# Patient Record
Sex: FEMALE | Race: WHITE | NOT HISPANIC OR LATINO | Employment: OTHER
[De-identification: names, ages, dates, MRNs, and addresses within clinical notes are randomized per-mention and may not be internally consistent; named-entity substitution may affect disease eponyms.]

---

## 2017-09-30 ENCOUNTER — HOSPITAL (OUTPATIENT)
Dept: OTHER | Age: 67
End: 2017-09-30
Attending: OBSTETRICS & GYNECOLOGY

## 2017-09-30 LAB
ALBUMIN SERPL-MCNC: 3.8 GM/DL (ref 3.6–5.1)
ALBUMIN/GLOB SERPL: 1.3 {RATIO} (ref 1–2.4)
ALP SERPL-CCNC: 63 UNIT/L (ref 45–117)
ALT SERPL-CCNC: 32 UNIT/L
ANION GAP SERPL CALC-SCNC: 12 MMOL/L (ref 10–20)
AST SERPL-CCNC: 20 UNIT/L
BILIRUB SERPL-MCNC: 0.4 MG/DL (ref 0.2–1)
BUN SERPL-MCNC: 23 MG/DL (ref 6–20)
BUN/CREAT SERPL: 40 (ref 7–25)
CALCIUM SERPL-MCNC: 9.1 MG/DL (ref 8.4–10.2)
CHLORIDE: 105 MMOL/L (ref 98–107)
CHOLEST SERPL-MCNC: 177 MG/DL
CHOLEST/HDLC SERPL: 1.7 {RATIO}
CO2 SERPL-SCNC: 28 MMOL/L (ref 21–32)
CREAT SERPL-MCNC: 0.58 MG/DL (ref 0.51–0.95)
GLOBULIN SER-MCNC: 3 GM/DL (ref 2–4)
GLUCOSE SERPL-MCNC: 97 MG/DL (ref 65–99)
HDLC SERPL-MCNC: 105 MG/DL
LDLC SERPL CALC-MCNC: 67 MG/DL
LENGTH OF FAST TIME PATIENT: ABNORMAL HR
LENGTH OF FAST TIME PATIENT: NORMAL HR
NONHDLC SERPL-MCNC: 72 MG/DL
POTASSIUM SERPL-SCNC: 4.3 MMOL/L (ref 3.4–5.1)
PROT SERPL-MCNC: 6.8 GM/DL (ref 6.4–8.2)
SODIUM SERPL-SCNC: 141 MMOL/L (ref 135–145)
TRIGLYCERIDE (TRIGP): 27 MG/DL

## 2017-10-19 ENCOUNTER — HOSPITAL (OUTPATIENT)
Dept: OTHER | Age: 67
End: 2017-10-19
Attending: FAMILY MEDICINE

## 2018-09-04 PROBLEM — M23.92 INTERNAL DERANGEMENT OF KNEE, LEFT: Status: ACTIVE | Noted: 2018-09-04

## 2018-09-20 PROBLEM — M25.562 ACUTE PAIN OF LEFT KNEE: Status: ACTIVE | Noted: 2018-09-20

## 2018-11-05 RX ORDER — ASCORBIC ACID 125 MG
1 TABLET,CHEWABLE ORAL NIGHTLY
COMMUNITY

## 2018-11-05 RX ORDER — MELATONIN 10 MG
1 TABLET, SUBLINGUAL SUBLINGUAL
COMMUNITY

## 2018-11-05 RX ORDER — CALCIUM/MAGNESIUM/ZINC 333-133 MG
1 TABLET ORAL DAILY
COMMUNITY

## 2018-11-05 RX ORDER — BLUE-GREEN ALGAE 500 MG
2 TABLET ORAL DAILY
COMMUNITY

## 2018-11-08 ENCOUNTER — HOSPITAL ENCOUNTER (OUTPATIENT)
Facility: HOSPITAL | Age: 68
Setting detail: HOSPITAL OUTPATIENT SURGERY
Discharge: HOME | End: 2018-11-08
Attending: ORTHOPAEDIC SURGERY | Admitting: ORTHOPAEDIC SURGERY
Payer: MEDICARE

## 2018-11-08 ENCOUNTER — ANESTHESIA EVENT (OUTPATIENT)
Dept: SURGERY | Facility: HOSPITAL | Age: 68
End: 2018-11-08
Payer: MEDICARE

## 2018-11-08 ENCOUNTER — ANESTHESIA (OUTPATIENT)
Dept: SURGERY | Facility: HOSPITAL | Age: 68
End: 2018-11-08
Payer: MEDICARE

## 2018-11-08 VITALS
RESPIRATION RATE: 15 BRPM | TEMPERATURE: 98 F | WEIGHT: 142 LBS | HEART RATE: 84 BPM | SYSTOLIC BLOOD PRESSURE: 118 MMHG | HEIGHT: 65 IN | OXYGEN SATURATION: 99 % | DIASTOLIC BLOOD PRESSURE: 72 MMHG | BODY MASS INDEX: 23.66 KG/M2

## 2018-11-08 DIAGNOSIS — M23.92 INTERNAL DERANGEMENT OF LEFT KNEE: ICD-10-CM

## 2018-11-08 PROCEDURE — 0SBD4ZZ EXCISION OF LEFT KNEE JOINT, PERCUTANEOUS ENDOSCOPIC APPROACH: ICD-10-PCS | Performed by: ORTHOPAEDIC SURGERY

## 2018-11-08 RX ORDER — METOCLOPRAMIDE 10 MG/1
10 TABLET ORAL ONCE
Status: DISCONTINUED | OUTPATIENT
Start: 2018-11-08 | End: 2018-11-08 | Stop reason: HOSPADM

## 2018-11-08 RX ORDER — MORPHINE SULFATE 10 MG/ML
6 INJECTION, SOLUTION INTRAMUSCULAR; INTRAVENOUS EVERY 10 MIN PRN
Status: DISCONTINUED | OUTPATIENT
Start: 2018-11-08 | End: 2018-11-08

## 2018-11-08 RX ORDER — LIDOCAINE HYDROCHLORIDE 10 MG/ML
INJECTION, SOLUTION EPIDURAL; INFILTRATION; INTRACAUDAL; PERINEURAL AS NEEDED
Status: DISCONTINUED | OUTPATIENT
Start: 2018-11-08 | End: 2018-11-08 | Stop reason: SURG

## 2018-11-08 RX ORDER — ONDANSETRON 2 MG/ML
INJECTION INTRAMUSCULAR; INTRAVENOUS AS NEEDED
Status: DISCONTINUED | OUTPATIENT
Start: 2018-11-08 | End: 2018-11-08 | Stop reason: SURG

## 2018-11-08 RX ORDER — HYDROCODONE BITARTRATE AND ACETAMINOPHEN 5; 325 MG/1; MG/1
1 TABLET ORAL EVERY 6 HOURS PRN
Qty: 30 TABLET | Refills: 0 | Status: SHIPPED | OUTPATIENT
Start: 2018-11-08 | End: 2018-11-18

## 2018-11-08 RX ORDER — DEXAMETHASONE SODIUM PHOSPHATE 4 MG/ML
VIAL (ML) INJECTION AS NEEDED
Status: DISCONTINUED | OUTPATIENT
Start: 2018-11-08 | End: 2018-11-08 | Stop reason: SURG

## 2018-11-08 RX ORDER — SODIUM CHLORIDE, SODIUM LACTATE, POTASSIUM CHLORIDE, CALCIUM CHLORIDE 600; 310; 30; 20 MG/100ML; MG/100ML; MG/100ML; MG/100ML
INJECTION, SOLUTION INTRAVENOUS CONTINUOUS
Status: DISCONTINUED | OUTPATIENT
Start: 2018-11-08 | End: 2018-11-08

## 2018-11-08 RX ORDER — FAMOTIDINE 20 MG/1
20 TABLET ORAL ONCE
Status: DISCONTINUED | OUTPATIENT
Start: 2018-11-08 | End: 2018-11-08 | Stop reason: HOSPADM

## 2018-11-08 RX ORDER — MORPHINE SULFATE 4 MG/ML
2 INJECTION, SOLUTION INTRAMUSCULAR; INTRAVENOUS EVERY 10 MIN PRN
Status: DISCONTINUED | OUTPATIENT
Start: 2018-11-08 | End: 2018-11-08

## 2018-11-08 RX ORDER — HYDROCODONE BITARTRATE AND ACETAMINOPHEN 5; 325 MG/1; MG/1
2 TABLET ORAL AS NEEDED
Status: DISCONTINUED | OUTPATIENT
Start: 2018-11-08 | End: 2018-11-08

## 2018-11-08 RX ORDER — MORPHINE SULFATE 4 MG/ML
4 INJECTION, SOLUTION INTRAMUSCULAR; INTRAVENOUS EVERY 10 MIN PRN
Status: DISCONTINUED | OUTPATIENT
Start: 2018-11-08 | End: 2018-11-08

## 2018-11-08 RX ORDER — HYDROCODONE BITARTRATE AND ACETAMINOPHEN 5; 325 MG/1; MG/1
1 TABLET ORAL AS NEEDED
Status: DISCONTINUED | OUTPATIENT
Start: 2018-11-08 | End: 2018-11-08

## 2018-11-08 RX ORDER — CEFAZOLIN SODIUM/WATER 2 G/20 ML
2 SYRINGE (ML) INTRAVENOUS ONCE
Status: COMPLETED | OUTPATIENT
Start: 2018-11-08 | End: 2018-11-08

## 2018-11-08 RX ORDER — ONDANSETRON 2 MG/ML
4 INJECTION INTRAMUSCULAR; INTRAVENOUS ONCE AS NEEDED
Status: DISCONTINUED | OUTPATIENT
Start: 2018-11-08 | End: 2018-11-08

## 2018-11-08 RX ORDER — BUPIVACAINE HYDROCHLORIDE 2.5 MG/ML
INJECTION, SOLUTION EPIDURAL; INFILTRATION; INTRACAUDAL AS NEEDED
Status: DISCONTINUED | OUTPATIENT
Start: 2018-11-08 | End: 2018-11-08 | Stop reason: HOSPADM

## 2018-11-08 RX ORDER — NALOXONE HYDROCHLORIDE 0.4 MG/ML
80 INJECTION, SOLUTION INTRAMUSCULAR; INTRAVENOUS; SUBCUTANEOUS AS NEEDED
Status: DISCONTINUED | OUTPATIENT
Start: 2018-11-08 | End: 2018-11-08

## 2018-11-08 RX ORDER — HALOPERIDOL 5 MG/ML
0.25 INJECTION INTRAMUSCULAR ONCE AS NEEDED
Status: DISCONTINUED | OUTPATIENT
Start: 2018-11-08 | End: 2018-11-08

## 2018-11-08 RX ORDER — ACETAMINOPHEN 500 MG
1000 TABLET ORAL ONCE
Status: COMPLETED | OUTPATIENT
Start: 2018-11-08 | End: 2018-11-08

## 2018-11-08 RX ORDER — MIDAZOLAM HYDROCHLORIDE 1 MG/ML
INJECTION INTRAMUSCULAR; INTRAVENOUS AS NEEDED
Status: DISCONTINUED | OUTPATIENT
Start: 2018-11-08 | End: 2018-11-08 | Stop reason: SURG

## 2018-11-08 RX ADMIN — LIDOCAINE HYDROCHLORIDE 50 MG: 10 INJECTION, SOLUTION EPIDURAL; INFILTRATION; INTRACAUDAL; PERINEURAL at 12:40:00

## 2018-11-08 RX ADMIN — MIDAZOLAM HYDROCHLORIDE 2 MG: 1 INJECTION INTRAMUSCULAR; INTRAVENOUS at 12:40:00

## 2018-11-08 RX ADMIN — ONDANSETRON 4 MG: 2 INJECTION INTRAMUSCULAR; INTRAVENOUS at 12:40:00

## 2018-11-08 RX ADMIN — SODIUM CHLORIDE, SODIUM LACTATE, POTASSIUM CHLORIDE, CALCIUM CHLORIDE: 600; 310; 30; 20 INJECTION, SOLUTION INTRAVENOUS at 13:21:00

## 2018-11-08 RX ADMIN — CEFAZOLIN SODIUM/WATER 2 G: 2 G/20 ML SYRINGE (ML) INTRAVENOUS at 12:45:00

## 2018-11-08 RX ADMIN — SODIUM CHLORIDE, SODIUM LACTATE, POTASSIUM CHLORIDE, CALCIUM CHLORIDE: 600; 310; 30; 20 INJECTION, SOLUTION INTRAVENOUS at 12:40:00

## 2018-11-08 RX ADMIN — DEXAMETHASONE SODIUM PHOSPHATE 4 MG: 4 MG/ML VIAL (ML) INJECTION at 12:40:00

## 2018-11-08 NOTE — ANESTHESIA PREPROCEDURE EVALUATION
Anesthesia PreOp Note    HPI:     Cande Myrick is a 76year old female who presents for preoperative consultation requested by: Camille Meehan MD    Date of Surgery: 11/8/2018    Procedure(s):  KNEE ARTHROSCOPY  Indication: Internal derangemen TURMERIC OR Take 1 tablet by mouth daily. Disp:  Rfl:  11/4/2018 at Unknown time   Triamcinolone Acetonide (NASACORT AQ NA) 1 spray by Nasal route as needed.  Disp:  Rfl:  11/7/2018 at Unknown time   Cyanocobalamin (VITAMIN B-12 OR) Take 1 tablet by mouth children: Not on file      Years of education: Not on file      Highest education level: Not on file    Social Needs      Financial resource strain: Not on file      Food insecurity - worry: Not on file      Food insecurity - inability: Not on file      Tr family member of the nature of the anesthetic plan, benefits, risks including possible dental damage if relevant, major complications, and any alternative forms of anesthetic management.    All of the patient's questions were answered to the best of my abil

## 2018-11-08 NOTE — ANESTHESIA POSTPROCEDURE EVALUATION
Patient: Kisha Astudillo    Procedure Summary     Date:  11/08/18 Room / Location:  [address] [place] [address] MAIN OR 04 / [address] [place] [address] MAIN OR    Anesthesia Start:  0265 Anesthesia Stop:  8453    Procedure:  KNEE ARTHROSCOPY (Left ) Diagnosis:       Internal derangement of lef

## 2018-11-08 NOTE — H&P
History & Physical Examination    Patient Name: Avelino Gutiérrez  MRN: [id number]  [facility]: [id number]  YOB: 1950    Diagnosis: LEFT KNEE MMT, LMT, OA    Present Illness: FAILED CONSERVATIVE MEASURES      No medications prior to admission

## 2018-11-08 NOTE — OPERATIVE REPORT
[facility]    PATIENT'S NAME: MILY GIL   ATTENDING PHYSICIAN: Eladio Lam MD   OPERATING PHYSICIAN: Eladio Lam MD   PATIENT ACCOUNT#:   [id number]    LOCATION:  [facility] PACU 1 [facility]  MEDICAL RECORD #:   [id number] revealed no loose bodies. At this time, the joint was irrigated thoroughly. Portals were closed with 3-0 nylon. Portals were injected with 0.25% Marcaine plain. Sterile dressings were applied. The patient tolerated the procedure well.   She was taken t

## 2018-11-08 NOTE — ANESTHESIA PROCEDURE NOTES
ANESTHESIA INTUBATION  Date/Time: 11/8/2018 12:43 PM  Urgency: elective      General Information and Staff    Patient location during procedure: OR  Anesthesiologist: Kaycee Plasencia MD  Performed: anesthesiologist     Indications and Patient Condition  Indic

## 2018-11-20 PROBLEM — Z98.890 S/P LEFT KNEE ARTHROSCOPY: Status: ACTIVE | Noted: 2018-11-20

## 2019-05-30 ENCOUNTER — HOSPITAL (OUTPATIENT)
Dept: OTHER | Age: 69
End: 2019-05-30
Attending: OBSTETRICS & GYNECOLOGY

## 2019-11-04 ENCOUNTER — OFFICE VISIT (OUTPATIENT)
Dept: PHYSICAL THERAPY | Age: 69
End: 2019-11-04
Attending: ORTHOPAEDIC SURGERY
Payer: MEDICARE

## 2019-11-04 DIAGNOSIS — M41.80 SCOLIOSIS DUE TO DEGENERATIVE DISEASE OF SPINE IN ADULT PATIENT: ICD-10-CM

## 2019-11-04 PROCEDURE — 97161 PT EVAL LOW COMPLEX 20 MIN: CPT

## 2019-11-06 ENCOUNTER — OFFICE VISIT (OUTPATIENT)
Dept: PHYSICAL THERAPY | Age: 69
End: 2019-11-06
Attending: ORTHOPAEDIC SURGERY
Payer: MEDICARE

## 2019-11-06 PROCEDURE — 97110 THERAPEUTIC EXERCISES: CPT

## 2019-11-06 PROCEDURE — 97112 NEUROMUSCULAR REEDUCATION: CPT

## 2019-11-06 NOTE — PROGRESS NOTES
Dx: Scoliosis due to DJD         Insurance (Authorized # of Visits):  8           Authorizing Physician: Dr. Witlon Woodruff  Next MD visit:   Fall Risk: standard         Precautions: n/a             Subjective: Have not taken Meloxicam for the past 2 days and the

## 2019-11-13 ENCOUNTER — OFFICE VISIT (OUTPATIENT)
Dept: PHYSICAL THERAPY | Age: 69
End: 2019-11-13
Attending: ORTHOPAEDIC SURGERY
Payer: MEDICARE

## 2019-11-13 PROCEDURE — 97110 THERAPEUTIC EXERCISES: CPT

## 2019-11-13 PROCEDURE — 97112 NEUROMUSCULAR REEDUCATION: CPT

## 2019-11-13 NOTE — PROGRESS NOTES
Dx: Scoliosis due to DJD         Insurance (Authorized # of Visits):  8           Authorizing Physician: Dr. Wilton Woodruff  Next MD visit:   Fall Risk: standard         Precautions: n/a             Subjective: I stopped taking Meloxicam as it is not helping with support  Heel raises x 15  March with high knees x 10 each R/L      FTS precor wall pulleys  Sh ext 90-0 5# R/L x 10  Side stepping for mulitifidus with 5# R/L x 10  Trunk rotation with 5# R/L x 10  Sit to stand x 10 push up with hands on thighs Sit to sta

## 2019-11-20 ENCOUNTER — OFFICE VISIT (OUTPATIENT)
Dept: PHYSICAL THERAPY | Age: 69
End: 2019-11-20
Attending: ORTHOPAEDIC SURGERY
Payer: MEDICARE

## 2019-11-20 PROCEDURE — 97112 NEUROMUSCULAR REEDUCATION: CPT

## 2019-11-20 PROCEDURE — 97110 THERAPEUTIC EXERCISES: CPT

## 2019-11-20 NOTE — PROGRESS NOTES
Dx: Scoliosis due to DJD         Insurance (Authorized # of Visits):  8           Authorizing Physician: Dr. Fransico Howell MD visit:   Fall Risk: standard         Precautions: n/a             Subjective: Over the weekend the pain in the back and knee were sec hold  Hook lying hip abd with green t band x 20  Side lying hip abd R/L x 20  Seated LAQ knee ext R/L x 20 Supine SKTC stretch R/L 3 x 10 sec hold  Supine PROM hip flex, abd, ER, IR x 10 each  Hook lying bridging x 10  Hook lying bent leg lift R/L x 10

## 2019-11-25 ENCOUNTER — OFFICE VISIT (OUTPATIENT)
Dept: PHYSICAL THERAPY | Age: 69
End: 2019-11-25
Attending: ORTHOPAEDIC SURGERY
Payer: MEDICARE

## 2019-11-25 PROCEDURE — 97112 NEUROMUSCULAR REEDUCATION: CPT

## 2019-11-25 PROCEDURE — 97110 THERAPEUTIC EXERCISES: CPT

## 2019-11-25 NOTE — PROGRESS NOTES
Dx: Scoliosis due to DJD         Insurance (Authorized # of Visits):  8           Authorizing Physician: Dr. Jared Howell MD visit:   Fall Risk: standard         Precautions: n/a             Subjective: Taking Meloxicam once a day, with 1 day off.  Used he 20  Seated LAQ knee ext R/L x 20 Supine SKTC stretch R/L 3 x 10 sec hold  Supine PROM hip flex, abd, ER, IR x 10 each  Hook lying bridging x 10  Hook lying bent leg lift R/L x 10  LTR x 10  Supine SLR R/L x 10  Side lying clam R/L x 10  Hip abd R/L x 10 Ca

## 2019-12-02 ENCOUNTER — OFFICE VISIT (OUTPATIENT)
Dept: PHYSICAL THERAPY | Age: 69
End: 2019-12-02
Attending: ORTHOPAEDIC SURGERY
Payer: MEDICARE

## 2019-12-02 PROCEDURE — 97112 NEUROMUSCULAR REEDUCATION: CPT

## 2019-12-02 PROCEDURE — 97110 THERAPEUTIC EXERCISES: CPT

## 2019-12-02 NOTE — PROGRESS NOTES
Dx: Scoliosis due to DJD         Insurance (Authorized # of Visits):  8           Authorizing Physician: Dr. Dejon Blake  Next MD visit:   Fall Risk: standard         Precautions: n/a             Subjective:  The knee is doing much better, still taking Meloxica set R with DF x 10  Heels on SB DKTC 2 x 10  Heels on SB bridge 2 x 10  Supine SLR R x 10  Manual hamstring stretch R 2 x 30 sec hold  Hook lying hip abd with green t band x 20  Side lying hip abd R/L x 20  Seated LAQ knee ext R/L x 20 Supine SKTC stretch bent leg lift, sit to stand, hip abd    Charges: EX 2, NMRed 1       Total Timed Treatment: 45 min  Total Treatment Time: 45 min

## 2019-12-04 ENCOUNTER — OFFICE VISIT (OUTPATIENT)
Dept: PHYSICAL THERAPY | Age: 69
End: 2019-12-04
Attending: ORTHOPAEDIC SURGERY
Payer: MEDICARE

## 2019-12-04 PROCEDURE — 97112 NEUROMUSCULAR REEDUCATION: CPT

## 2019-12-04 PROCEDURE — 97110 THERAPEUTIC EXERCISES: CPT

## 2019-12-04 NOTE — PROGRESS NOTES
Dx: Scoliosis due to DJD         Insurance (Authorized # of Visits):  8           Authorizing Physician: Dr. Colletta Rival  Next MD visit:   Fall Risk: standard         Precautions: n/a             Subjective: I was sore yesterday and today after last therapy vi quad set R with DF x 10  Heels on SB DKTC 2 x 10  Heels on SB bridge 2 x 10  Supine SLR R x 10  Manual hamstring stretch R 2 x 30 sec hold  Hook lying hip abd with green t band x 20  Side lying hip abd R/L x 20  Seated LAQ knee ext R/L x 20 Supine SKTC str stretch R/L 30 sec hold  Supine R knee flex, ext x 10  SLR R x 15   FTS precor wall pulleys  Sh ext 90-0 5# R/L x 10  Side stepping for mulitifidus with 5# R/L x 10  Trunk rotation with 5# R/L x 10  Sit to stand x 10 push up with hands on thighs Sit to sta

## 2019-12-06 ENCOUNTER — APPOINTMENT (OUTPATIENT)
Dept: PHYSICAL THERAPY | Age: 69
End: 2019-12-06
Attending: ORTHOPAEDIC SURGERY
Payer: MEDICARE

## 2019-12-30 ENCOUNTER — OFFICE VISIT (OUTPATIENT)
Dept: PHYSICAL THERAPY | Age: 69
End: 2019-12-30
Attending: ORTHOPAEDIC SURGERY
Payer: MEDICARE

## 2019-12-30 PROCEDURE — 97110 THERAPEUTIC EXERCISES: CPT

## 2019-12-30 PROCEDURE — 97112 NEUROMUSCULAR REEDUCATION: CPT

## 2019-12-31 NOTE — PROGRESS NOTES
Dx: Scoliosis due to DJD         Insurance (Authorized # of Visits):  8           Authorizing Physician: Dr. Fidelina Gabriel  Next MD visit:   Fall Risk: standard         Precautions: n/a           Progress report:  Subjective: I have been really busy, managing we lying bent leg lift R/L x 10  Hook lying bridging x 10   Supine SKTC stretch R/L 3 x 10 sec hold  Hook lying bridging x 10  Bent leg lift R/L x 10  DBL bent leg lifts x 5  Hook lying bent leg fallout R/L x 10  Curl up level 1 x 10 Supine quad set R with DF 10  Side stepping for multifidus holding red t band R/L x 10  Step ups 6 in fwd R/L x 10  March with high knees R/L x 10 Side lying hip abd R/L x 15  Clam R/L x 15  Prone lying alternating knee flexion R/L x 15  Prone hip extension R/L x 10   Side lying hi

## 2020-01-02 ENCOUNTER — OFFICE VISIT (OUTPATIENT)
Dept: PHYSICAL THERAPY | Age: 70
End: 2020-01-02
Attending: ORTHOPAEDIC SURGERY
Payer: MEDICARE

## 2020-01-02 PROCEDURE — 97110 THERAPEUTIC EXERCISES: CPT

## 2020-01-02 NOTE — PROGRESS NOTES
Dx: Scoliosis due to DJD         Insurance (Authorized # of Visits):  8           Authorizing Physician: Dr. Ed Martínez  Next MD visit:   Fall Risk: standard         Precautions: n/a           Progress report:  Subjective: I have been really busy, managing we mins  DBL leg press 7 bands x 20  Supine PROM hip/knee flexion R/L x 10  Hook lying bent leg lift R/L x 10  Hook lying bridging x 10 5 mins    X 20    X 10    X 10  X 10   Supine SKTC stretch R/L 3 x 10 sec hold  Hook lying bridging x 10  Bent leg lift R/L red t band R/L x 20  Step ups fwd 6in R/L with light UE support  Heel raises x 15  March with high knees x 10 each R/L Sit to stand push up with hands on thighs x 10  Side stepping for multifidus holding red t band R/L x 10  Step ups 6 in fwd R/L x 10  Mar

## 2020-01-07 ENCOUNTER — OFFICE VISIT (OUTPATIENT)
Dept: PHYSICAL THERAPY | Age: 70
End: 2020-01-07
Attending: ORTHOPAEDIC SURGERY
Payer: MEDICARE

## 2020-01-07 PROCEDURE — 97110 THERAPEUTIC EXERCISES: CPT

## 2020-01-07 NOTE — PROGRESS NOTES
Dx: Scoliosis due to DJD         Insurance (Authorized # of Visits):  8           Authorizing Physician: Dr. Don Howell MD visit:   Fall Risk: standard         Precautions: n/a           Discharge report:  Subjective: I have been managing well with the mins    X 20    X 10    X 10  X 10 Walk on treadmill @ 2.5 mph 5 mins  DBL leg press 7 bands x 20  Hook lying bent leg lift R/L x 10  Hook lying bridging x 20   Supine SKTC stretch R/L 3 x 10 sec hold  Hook lying bridging x 10  Bent leg lift R/L x 10  DBL R/L 20 sec hold   Side lying modified basic side plank with knees bent R/L x 10  Side lying hip abd R/L x 10  Prone lying hip ext R/L x 10  Quads stretch R/L 20 sec hold Standing red t band rows x 20  Side stepping holding red t band R/L x 20  Step ups fwd

## 2020-06-24 ENCOUNTER — TELEPHONE (OUTPATIENT)
Dept: GASTROENTEROLOGY | Facility: CLINIC | Age: 70
End: 2020-06-24

## 2020-06-24 ENCOUNTER — OFFICE VISIT (OUTPATIENT)
Dept: GASTROENTEROLOGY | Facility: CLINIC | Age: 70
End: 2020-06-24
Payer: MEDICARE

## 2020-06-24 VITALS
HEART RATE: 68 BPM | BODY MASS INDEX: 25.27 KG/M2 | DIASTOLIC BLOOD PRESSURE: 84 MMHG | SYSTOLIC BLOOD PRESSURE: 128 MMHG | HEIGHT: 64 IN | WEIGHT: 148 LBS

## 2020-06-24 DIAGNOSIS — Z86.010 PERSONAL HISTORY OF COLONIC POLYPS: Primary | ICD-10-CM

## 2020-06-24 DIAGNOSIS — Z12.12 SCREENING FOR COLORECTAL CANCER: Primary | ICD-10-CM

## 2020-06-24 DIAGNOSIS — Z12.11 SCREENING FOR COLORECTAL CANCER: Primary | ICD-10-CM

## 2020-06-24 PROCEDURE — G0463 HOSPITAL OUTPT CLINIC VISIT: HCPCS | Performed by: INTERNAL MEDICINE

## 2020-06-24 PROCEDURE — 99203 OFFICE O/P NEW LOW 30 MIN: CPT | Performed by: INTERNAL MEDICINE

## 2020-06-24 RX ORDER — POLYETHYLENE GLYCOL 3350, SODIUM CHLORIDE, SODIUM BICARBONATE, POTASSIUM CHLORIDE 420; 11.2; 5.72; 1.48 G/4L; G/4L; G/4L; G/4L
4 POWDER, FOR SOLUTION ORAL ONCE
Qty: 4000 ML | Refills: 0 | Status: SHIPPED | OUTPATIENT
Start: 2020-06-24 | End: 2020-06-24

## 2020-06-24 RX ORDER — MULTIVIT WITH MINERALS/LUTEIN
1000 TABLET ORAL DAILY
COMMUNITY

## 2020-06-24 RX ORDER — ACETAZOLAMIDE 250 MG/1
250 TABLET ORAL DAILY
COMMUNITY

## 2020-06-24 RX ORDER — FLUTICASONE PROPIONATE 50 MCG
1 SPRAY, SUSPENSION (ML) NASAL ONCE AS NEEDED
COMMUNITY

## 2020-06-24 RX ORDER — OMEPRAZOLE 20 MG/1
1 CAPSULE, DELAYED RELEASE ORAL DAILY
COMMUNITY

## 2020-06-24 NOTE — PATIENT INSTRUCTIONS
1.  We will obtain records from Dr. Maira Ramsey. 2.  Schedule colonoscopy for a history of polyps following a split dose TriLyte preparation and monitored anesthesia care.

## 2020-06-24 NOTE — TELEPHONE ENCOUNTER
Scheduled for:  Colonoscopy 06456  Provider Name:    Date:  9/2/2020  Location:  [facility]  Sedation:  Mac  Time:  2185 Am (pt is aware to arrive at 1015 Am)  Prep:  Trilyte Prep instructions were given to pt in the office, pt verbalized [place]

## 2020-06-28 NOTE — PROGRESS NOTES
HPI:    Patient ID: Ramón Tijerina is a 79year old female. HPI  The patient is seen today with her .   She is self-referred by a friend and patient of mine for colorectal cancer screening in the setting of a recommended colonoscopy reca (VITAMIN D3) 32518 units Oral Tab Take 1 tablet by mouth. • Magnesium Citrate 200 MG Oral Tab Take 2 tablets by mouth daily. • LUTEIN OR Take 25 mg by mouth daily. • Coenzyme Q10 (COQ-10) 200 MG Oral Cap Take 1 tablet by mouth.      • Diphenhydr Allergies:No Known Allergies   PHYSICAL EXAM:   Physical Exam   Constitutional: She is oriented to person, place, and time. She appears well-developed and well-nourished. No distress. HENT:   Head: Normocephalic and atraumatic.    Mouth/Throat: No ev absence of Begum's esophagus is not typically recommended. Records will be reviewed with regards to prior endoscopic findings and need for surveillance.     ASA 2      Meds This Visit:  Requested Prescriptions     Signed Prescriptions Disp Refills   • PE

## 2020-07-08 NOTE — TELEPHONE ENCOUNTER
Received fax from Dr. Bartolo Murillo office 45- op and path reports. Placed on Dr. Guerita Garay desk for review.

## 2020-08-18 ENCOUNTER — TELEPHONE (OUTPATIENT)
Dept: GASTROENTEROLOGY | Facility: CLINIC | Age: 70
End: 2020-08-18

## 2020-08-18 DIAGNOSIS — Z86.010 HISTORY OF COLON POLYPS: Primary | ICD-10-CM

## 2020-08-18 NOTE — TELEPHONE ENCOUNTER
Pt called to cancel 9/2/20 colonoscopy. Pt will call back to reschedule when pt feels it is safe to do procedure.

## 2020-08-19 NOTE — TELEPHONE ENCOUNTER
Cancelled for:  Colonoscopy 70188  Provider Name:  Dr. Manuel Shane  Date:  9/2/20  Location:    [facility]  Sedation:  MAC  Time:  200  Prep:  Carren Dumargo  Meds/Allergies Reconciled?:  Physician reviewed   Diagnosis with codes:  History of colon polyps Z86.010  Was

## 2020-08-21 NOTE — TELEPHONE ENCOUNTER
Records reviewed and sent for scanning. Summary as below:    11/15/2016:  EGD: Moderate antral gastritis submucosal esophageal lesion at 28 cm, possibly extraluminal biopsy negative. Duodenal biopsy negative for celiac sprue.   Gastric biopsy negative for

## 2021-05-19 ENCOUNTER — HOSPITAL ENCOUNTER (OUTPATIENT)
Dept: MAMMOGRAPHY | Age: 71
Discharge: HOME | End: 2021-05-19
Attending: OBSTETRICS & GYNECOLOGY

## 2021-05-19 DIAGNOSIS — Z12.31 ENCOUNTER FOR SCREENING MAMMOGRAM FOR MALIGNANT NEOPLASM OF BREAST: ICD-10-CM

## 2021-05-19 PROCEDURE — 77063 BREAST TOMOSYNTHESIS BI: CPT

## 2021-05-19 PROCEDURE — 77067 SCR MAMMO BI INCL CAD: CPT

## 2022-02-18 ENCOUNTER — LAB REQUISITION (OUTPATIENT)
Dept: LAB | Age: 72
End: 2022-02-18

## 2022-02-18 ENCOUNTER — HOSPITAL ENCOUNTER (OUTPATIENT)
Dept: LAB | Age: 72
Discharge: HOME | End: 2022-02-18
Attending: INTERNAL MEDICINE

## 2022-02-18 DIAGNOSIS — I10 HYPERTENSIVE DISORDER: Primary | ICD-10-CM

## 2022-02-18 DIAGNOSIS — Z28.39 UNDERIMMUNIZATION STATUS: ICD-10-CM

## 2022-02-18 DIAGNOSIS — I10 HYPERTENSIVE DISORDER: ICD-10-CM

## 2022-02-18 DIAGNOSIS — I10 ESSENTIAL (PRIMARY) HYPERTENSION: ICD-10-CM

## 2022-02-18 LAB
ALBUMIN SERPL-MCNC: 3.9 G/DL (ref 3.6–5.1)
ALBUMIN/GLOB SERPL: 1.2 {RATIO} (ref 1–2.4)
ALP SERPL-CCNC: 82 UNITS/L (ref 45–117)
ALT SERPL-CCNC: 27 UNITS/L
ANION GAP SERPL CALC-SCNC: 9 MMOL/L (ref 10–20)
AST SERPL-CCNC: 22 UNITS/L
ATRIAL RATE (BPM): 70
BILIRUB SERPL-MCNC: 0.5 MG/DL (ref 0.2–1)
BUN SERPL-MCNC: 20 MG/DL (ref 6–20)
BUN/CREAT SERPL: 36 (ref 7–25)
CALCIUM SERPL-MCNC: 10 MG/DL (ref 8.4–10.2)
CHLORIDE SERPL-SCNC: 105 MMOL/L (ref 98–107)
CO2 SERPL-SCNC: 28 MMOL/L (ref 21–32)
CREAT SERPL-MCNC: 0.55 MG/DL (ref 0.51–0.95)
FASTING DURATION TIME PATIENT: ABNORMAL H
GFR SERPLBLD BASED ON 1.73 SQ M-ARVRAT: >90 ML/MIN
GLOBULIN SER-MCNC: 3.2 G/DL (ref 2–4)
GLUCOSE SERPL-MCNC: 78 MG/DL (ref 70–99)
P AXIS (DEGREES): 66
POTASSIUM SERPL-SCNC: 4.6 MMOL/L (ref 3.4–5.1)
PR-INTERVAL (MSEC): 188
PROT SERPL-MCNC: 7.1 G/DL (ref 6.4–8.2)
QRS-INTERVAL (MSEC): 78
QT-INTERVAL (MSEC): 392
QTC: 423
R AXIS (DEGREES): -30
REPORT TEXT: NORMAL
SODIUM SERPL-SCNC: 137 MMOL/L (ref 135–145)
T AXIS (DEGREES): 68
VENTRICULAR RATE EKG/MIN (BPM): 70

## 2022-02-18 PROCEDURE — 93005 ELECTROCARDIOGRAM TRACING: CPT | Performed by: INTERNAL MEDICINE

## 2022-02-18 PROCEDURE — 93010 ELECTROCARDIOGRAM REPORT: CPT | Performed by: INTERNAL MEDICINE

## 2022-02-18 PROCEDURE — 80053 COMPREHEN METABOLIC PANEL: CPT | Performed by: CLINICAL MEDICAL LABORATORY

## 2022-02-18 PROCEDURE — 86706 HEP B SURFACE ANTIBODY: CPT | Performed by: CLINICAL MEDICAL LABORATORY

## 2022-02-18 PROCEDURE — 36415 COLL VENOUS BLD VENIPUNCTURE: CPT | Performed by: CLINICAL MEDICAL LABORATORY

## 2022-02-25 LAB — HBV SURFACE AB SER QL: POSITIVE

## 2022-07-08 ENCOUNTER — HOSPITAL ENCOUNTER (OUTPATIENT)
Dept: MAMMOGRAPHY | Age: 72
Discharge: HOME | End: 2022-07-08
Attending: OBSTETRICS & GYNECOLOGY

## 2022-07-08 DIAGNOSIS — Z12.31 ENCOUNTER FOR SCREENING MAMMOGRAM FOR MALIGNANT NEOPLASM OF BREAST: ICD-10-CM

## 2022-07-08 PROCEDURE — 77063 BREAST TOMOSYNTHESIS BI: CPT

## 2023-07-03 NOTE — PROGRESS NOTES
LOWER EXTREMITY EVALUATION:   Referring Physician: Dr. Elbert Paget  Diagnosis: Lumbar Scoliosis due to DJD Date of Service: 11/4/2019     PATIENT SUMMARY   Alana Raygoza is a 71year old female who presents to therapy today with complaints of low Flexibility:  Hip Flexor: moderate tightness  Hamstrings: minimal tightness  Piriformis: normal  Quads: moderate tightness  Gastroc-soleus: minimal tightness    Strength/MMT: (* denotes performed with pain)  Hip Knee   Flexion: R 3+/5; L 3+/5  Extensio certification required: Yes  I certify the need for these services furnished under this plan of treatment and while under my care.     X___________________________________________________ Date____________________    Certification From: 56/3/3298  To:2/2/202 no edema per flow sheets

## (undated) DEVICE — SUTURE ETHILON 3-0 669H

## (undated) DEVICE — ARTHROSCOPY: Brand: MEDLINE INDUSTRIES, INC.

## (undated) DEVICE — BLADE SHVR 13CM 4MM EXCLBR

## (undated) DEVICE — 3M™ COBAN™ NL STERILE NON-LATEX SELF-ADHERENT WRAP, 2084S, 4 IN X 5 YD (10 CM X 4,5 M), 18 ROLLS/CASE: Brand: 3M™ COBAN™

## (undated) DEVICE — ZIMMER® STERILE DISPOSABLE TOURNIQUET CUFF WITH PLC, DUAL PORT, SINGLE BLADDER, 34 IN. (86 CM)

## (undated) DEVICE — DRAPE ARTHROSCOPY KNEE

## (undated) DEVICE — COTTON UNDERCAST PADDING,REGULAR FINISH: Brand: WEBRIL

## (undated) DEVICE — CHLORAPREP 26ML APPLICATOR

## (undated) DEVICE — SUCTION CANISTER, 3000CC,SAFELINER: Brand: DEROYAL

## (undated) DEVICE — STERILE POLYISOPRENE POWDER-FREE SURGICAL GLOVES: Brand: PROTEXIS

## (undated) DEVICE — SOL  .9 3000ML

## (undated) DEVICE — TUBING IRR 16FT CNT WV 3 ASCP